# Patient Record
Sex: FEMALE | Race: WHITE | NOT HISPANIC OR LATINO | Employment: OTHER | URBAN - METROPOLITAN AREA
[De-identification: names, ages, dates, MRNs, and addresses within clinical notes are randomized per-mention and may not be internally consistent; named-entity substitution may affect disease eponyms.]

---

## 2021-03-10 DIAGNOSIS — Z23 ENCOUNTER FOR IMMUNIZATION: ICD-10-CM

## 2021-09-14 ENCOUNTER — EVALUATION (OUTPATIENT)
Dept: PHYSICAL THERAPY | Facility: MEDICAL CENTER | Age: 66
End: 2021-09-14
Payer: MEDICARE

## 2021-09-14 DIAGNOSIS — G50.1 ATYPICAL FACIAL PAIN: Primary | ICD-10-CM

## 2021-09-14 PROCEDURE — 97161 PT EVAL LOW COMPLEX 20 MIN: CPT | Performed by: PHYSICAL THERAPIST

## 2021-09-14 NOTE — LETTER
September 15, 2021    Jamir Sterling DDS  [address] [facility]  [place] [address] [facility]    Patient: Mahogany Sabillon   YOB: 1955   Date of Visit: 2021     Encounter Diagnosis     ICD-10-CM    1  Atypical facial pain  G50 1        Dear Dr Kristina Yeager: Thank you for your recent referral of Mahogany Sabillon  Please review the attached evaluation summary from [facility] recent visit  Please verify that you agree with the plan of care by signing the attached order  If you have any questions or concerns, please do not hesitate to call  I sincerely appreciate the opportunity to share in the care of one of your patients and hope to have another opportunity to work with you in the near future  Sincerely,    Alicia Bernstein, PT      Referring Provider:      I certify that I have read the below Plan of Care and certify the need for these services furnished under this plan of treatment while under my care  Jamir Sterling DDS  [address] [place] [address]  Via Fax: [phone]          PT Evaluation     Today's date: 9/15/2021  Patient name: Mahogany Sabillon  : 1955  MRN: [id number]  Referring provider: Agustina Torres DDS  Dx:   Encounter Diagnosis     ICD-10-CM    1  Atypical facial pain  G50 1                   Assessment  Assessment details: Mahogany Sabillon is a 78 y o  female was evaluated on 9/15/2021  for Atypical facial pain  (primary encounter diagnosis)  Mahogany Sabillon has the above listed impairments resulting in functional deficits and negative impact to quality of life  Patient is appropriate for skilled PT intervention to promote maximal return to function and patient specific goals  Patient agrees with outlined treatment plan and all questions were answered to their satisfaction        Impairments: abnormal muscle firing, abnormal muscle tone, abnormal or restricted ROM, impaired physical strength, lacks appropriate home exercise program and pain with function  Understanding of Dx/Px/POC: good   Prognosis: good    Goals  Patient will successfully transition to home exercise program   Patient will be able to manage symptoms independently  Siva Thakkar will report no flare ups of her left sided facial pain  Sivaedyta Thakkar will improve cervical mobility and reduced scalene restriction on L side which contributes to her symptoms        Plan  Patient would benefit from: skilled PT  Referral necessary: No  Planned modality interventions: thermotherapy: hydrocollator packs  Planned therapy interventions: home exercise program, manual therapy, neuromuscular re-education, patient education, functional ROM exercises, strengthening, stretching, joint mobilization, graded activity, graded exercise, therapeutic exercise, body mechanics training, motor coordination training and activity modification  Frequency: 2x week  Duration in weeks: 12  Treatment plan discussed with: patient        Subjective Evaluation    History of Present Illness  Mechanism of injury: Tanya Harris is a 78 y o  [place] Spruce female presenting to therapy with complaints of left sided facial and TMJ pain  She notes multiple instances in the past where she either had trauma to face which set off her symptoms or most recently, biting into a crouton  She gets intense onset of left sided pain and feeling of fullness and swelling  She currently is doing fairly well but is concerned about flare ups in future  She admits to nocturnal grinding but does not want to have  due to discomfort wearing  Pain  Current pain ratin  At best pain ratin  At worst pain ratin  Quality: dull ache, pressure, tight and discomfort    Patient Goals  Patient goals for therapy: decreased pain  Patient goal: Avoidance of future flare ups         Objective     Palpation   Left   Tenderness of the scalenes, sternocleidomastoid and upper trapezius  Trigger point to scalenes, sternocleidomastoid and upper trapezius       Active Range of Motion   Cervical/Thoracic Spine       Cervical    Flexion:  Restriction level: minimal  Extension:  Restriction level: moderate  Left lateral flexion:  Restriction level: moderate  Right lateral flexion:  Restriction level moderate  Left rotation:  Restriction level: moderate  Right rotation:  Restriction level: moderate  TMJ   Jaw observations: facial symmetry within normal limits  good oral hygiene  Jaw trauma: yes  Joint sounds left: clicking  Joint sounds right: normal  Lateral bite test, Left: no pain  Lateral bite test, right: no pain  Opening (mm): 52 and within normal limits   Lateral excursion, left (mm): 4  Lateral excursion, right (mm)t: 8              Precautions: None      Manuals 9/14            Scalene MFR AF            Upper cervical mobilizations AF                                      Neuro Re-Ed             Controlled opening              Tala                                                                              Ther Ex             C1/2 snag              Scalene stretch                                                                                            Ther Activity                                       Gait Training                                       Modalities

## 2021-09-15 NOTE — PROGRESS NOTES
PT Evaluation     Today's date: 9/15/2021  Patient name: Clarice Arias  : 1955  MRN: [id number]  Referring provider: Luis Beatty DDS  Dx:   Encounter Diagnosis     ICD-10-CM    1  Atypical facial pain  G50 1                   Assessment  Assessment details: Clarice Arias is a 78 y o  female was evaluated on 9/15/2021  for Atypical facial pain  (primary encounter diagnosis)  Clarice Arias has the above listed impairments resulting in functional deficits and negative impact to quality of life  Patient is appropriate for skilled PT intervention to promote maximal return to function and patient specific goals  Patient agrees with outlined treatment plan and all questions were answered to their satisfaction  Impairments: abnormal muscle firing, abnormal muscle tone, abnormal or restricted ROM, impaired physical strength, lacks appropriate home exercise program and pain with function  Understanding of Dx/Px/POC: good   Prognosis: good    Goals  Patient will successfully transition to home exercise program   Patient will be able to manage symptoms independently  Arcelia Dubois will report no flare ups of her left sided facial pain     Arcelia Dubois will improve cervical mobility and reduced scalene restriction on L side which contributes to her symptoms        Plan  Patient would benefit from: skilled PT  Referral necessary: No  Planned modality interventions: thermotherapy: hydrocollator packs  Planned therapy interventions: home exercise program, manual therapy, neuromuscular re-education, patient education, functional ROM exercises, strengthening, stretching, joint mobilization, graded activity, graded exercise, therapeutic exercise, body mechanics training, motor coordination training and activity modification  Frequency: 2x week  Duration in weeks: 12  Treatment plan discussed with: patient        Subjective Evaluation    History of Present Illness  Mechanism of injury: Miguel Neville is a 78 y o  Clive Shawn female presenting to therapy with complaints of left sided facial and TMJ pain  She notes multiple instances in the past where she either had trauma to face which set off her symptoms or most recently, biting into a crouton  She gets intense onset of left sided pain and feeling of fullness and swelling  She currently is doing fairly well but is concerned about flare ups in future  She admits to nocturnal grinding but does not want to have  due to discomfort wearing  Pain  Current pain ratin  At best pain ratin  At worst pain ratin  Quality: dull ache, pressure, tight and discomfort    Patient Goals  Patient goals for therapy: decreased pain  Patient goal: Avoidance of future flare ups         Objective     Palpation   Left   Tenderness of the scalenes, sternocleidomastoid and upper trapezius  Trigger point to scalenes, sternocleidomastoid and upper trapezius       Active Range of Motion   Cervical/Thoracic Spine       Cervical    Flexion:  Restriction level: minimal  Extension:  Restriction level: moderate  Left lateral flexion:  Restriction level: moderate  Right lateral flexion:  Restriction level moderate  Left rotation:  Restriction level: moderate  Right rotation:  Restriction level: moderate  TMJ   Jaw observations: facial symmetry within normal limits  good oral hygiene  Jaw trauma: yes  Joint sounds left: clicking  Joint sounds right: normal  Lateral bite test, Left: no pain  Lateral bite test, right: no pain  Opening (mm): 52 and within normal limits   Lateral excursion, left (mm): 4  Lateral excursion, right (mm)t: 8              Precautions: None      Manuals             Scalene MFR AF            Upper cervical mobilizations AF                                      Neuro Re-Ed             Controlled opening              Tala                                                                              Ther Ex             C1/2 snag Scalene stretch                                                                                            Ther Activity                                       Gait Training                                       Modalities